# Patient Record
Sex: FEMALE | Race: WHITE | NOT HISPANIC OR LATINO | Employment: OTHER | RURAL
[De-identification: names, ages, dates, MRNs, and addresses within clinical notes are randomized per-mention and may not be internally consistent; named-entity substitution may affect disease eponyms.]

---

## 2021-08-08 ENCOUNTER — HOSPITAL ENCOUNTER (EMERGENCY)
Facility: HOSPITAL | Age: 56
Discharge: HOME | End: 2021-08-08
Payer: COMMERCIAL

## 2021-08-08 VITALS
SYSTOLIC BLOOD PRESSURE: 127 MMHG | DIASTOLIC BLOOD PRESSURE: 77 MMHG | OXYGEN SATURATION: 98 % | RESPIRATION RATE: 16 BRPM | TEMPERATURE: 98 F | BODY MASS INDEX: 31.84 KG/M2 | HEART RATE: 77 BPM | WEIGHT: 215 LBS | HEIGHT: 69 IN

## 2021-08-08 DIAGNOSIS — M17.11 PRIMARY OSTEOARTHRITIS OF RIGHT KNEE: Primary | ICD-10-CM

## 2021-08-08 DIAGNOSIS — R52 PAIN: ICD-10-CM

## 2021-08-08 PROCEDURE — 99283 EMERGENCY DEPT VISIT LOW MDM: CPT | Mod: ,,, | Performed by: NURSE PRACTITIONER

## 2021-08-08 PROCEDURE — 99283 PR EMERGENCY DEPT VISIT,LEVEL III: ICD-10-PCS | Mod: ,,, | Performed by: NURSE PRACTITIONER

## 2021-08-08 PROCEDURE — 99283 EMERGENCY DEPT VISIT LOW MDM: CPT

## 2021-08-08 RX ORDER — MELOXICAM 15 MG/1
15 TABLET ORAL DAILY PRN
Qty: 30 TABLET | Refills: 2 | Status: SHIPPED | OUTPATIENT
Start: 2021-08-08 | End: 2021-09-07

## 2021-08-10 PROBLEM — M17.11 PRIMARY OSTEOARTHRITIS OF RIGHT KNEE: Status: ACTIVE | Noted: 2021-08-10

## 2021-08-24 ENCOUNTER — HOSPITAL ENCOUNTER (OUTPATIENT)
Dept: RADIOLOGY | Facility: HOSPITAL | Age: 56
Discharge: HOME | End: 2021-08-24
Attending: ORTHOPAEDIC SURGERY
Payer: COMMERCIAL

## 2021-08-24 DIAGNOSIS — M25.561 ACUTE PAIN OF BOTH KNEES: ICD-10-CM

## 2021-08-24 DIAGNOSIS — M25.562 ACUTE PAIN OF BOTH KNEES: ICD-10-CM

## 2021-08-24 PROBLEM — M17.0 BILATERAL PRIMARY OSTEOARTHRITIS OF KNEE: Status: ACTIVE | Noted: 2021-08-24

## 2021-08-24 PROCEDURE — 73564 X-RAY EXAM KNEE 4 OR MORE: CPT | Mod: TC,50

## 2022-01-03 ENCOUNTER — OFFICE VISIT (OUTPATIENT)
Dept: FAMILY MEDICINE | Facility: CLINIC | Age: 57
End: 2022-01-03
Payer: COMMERCIAL

## 2022-01-03 VITALS
DIASTOLIC BLOOD PRESSURE: 84 MMHG | TEMPERATURE: 97 F | WEIGHT: 210 LBS | BODY MASS INDEX: 31.1 KG/M2 | HEART RATE: 105 BPM | HEIGHT: 69 IN | RESPIRATION RATE: 20 BRPM | SYSTOLIC BLOOD PRESSURE: 124 MMHG | OXYGEN SATURATION: 98 %

## 2022-01-03 DIAGNOSIS — J40 BRONCHITIS: Primary | ICD-10-CM

## 2022-01-03 PROCEDURE — 3079F PR MOST RECENT DIASTOLIC BLOOD PRESSURE 80-89 MM HG: ICD-10-PCS | Mod: ,,, | Performed by: FAMILY MEDICINE

## 2022-01-03 PROCEDURE — 3008F BODY MASS INDEX DOCD: CPT | Mod: ,,, | Performed by: FAMILY MEDICINE

## 2022-01-03 PROCEDURE — 1159F PR MEDICATION LIST DOCUMENTED IN MEDICAL RECORD: ICD-10-PCS | Mod: ,,, | Performed by: FAMILY MEDICINE

## 2022-01-03 PROCEDURE — 3074F PR MOST RECENT SYSTOLIC BLOOD PRESSURE < 130 MM HG: ICD-10-PCS | Mod: ,,, | Performed by: FAMILY MEDICINE

## 2022-01-03 PROCEDURE — 3079F DIAST BP 80-89 MM HG: CPT | Mod: ,,, | Performed by: FAMILY MEDICINE

## 2022-01-03 PROCEDURE — 99213 PR OFFICE/OUTPT VISIT, EST, LEVL III, 20-29 MIN: ICD-10-PCS | Mod: 25,,, | Performed by: FAMILY MEDICINE

## 2022-01-03 PROCEDURE — 3008F PR BODY MASS INDEX (BMI) DOCUMENTED: ICD-10-PCS | Mod: ,,, | Performed by: FAMILY MEDICINE

## 2022-01-03 PROCEDURE — 1159F MED LIST DOCD IN RCRD: CPT | Mod: ,,, | Performed by: FAMILY MEDICINE

## 2022-01-03 PROCEDURE — 96372 THER/PROPH/DIAG INJ SC/IM: CPT | Mod: ,,, | Performed by: FAMILY MEDICINE

## 2022-01-03 PROCEDURE — 96372 PR INJECTION,THERAP/PROPH/DIAG2ST, IM OR SUBCUT: ICD-10-PCS | Mod: ,,, | Performed by: FAMILY MEDICINE

## 2022-01-03 PROCEDURE — 99213 OFFICE O/P EST LOW 20 MIN: CPT | Mod: 25,,, | Performed by: FAMILY MEDICINE

## 2022-01-03 PROCEDURE — 1160F RVW MEDS BY RX/DR IN RCRD: CPT | Mod: ,,, | Performed by: FAMILY MEDICINE

## 2022-01-03 PROCEDURE — 1160F PR REVIEW ALL MEDS BY PRESCRIBER/CLIN PHARMACIST DOCUMENTED: ICD-10-PCS | Mod: ,,, | Performed by: FAMILY MEDICINE

## 2022-01-03 PROCEDURE — 3074F SYST BP LT 130 MM HG: CPT | Mod: ,,, | Performed by: FAMILY MEDICINE

## 2022-01-03 RX ORDER — ALBUTEROL SULFATE 90 UG/1
2 AEROSOL, METERED RESPIRATORY (INHALATION) EVERY 6 HOURS PRN
Qty: 18 G | Refills: 2 | Status: SHIPPED | OUTPATIENT
Start: 2022-01-03 | End: 2023-01-03

## 2022-01-03 RX ORDER — METHYLPREDNISOLONE ACETATE 40 MG/ML
40 INJECTION, SUSPENSION INTRA-ARTICULAR; INTRALESIONAL; INTRAMUSCULAR; SOFT TISSUE
Status: COMPLETED | OUTPATIENT
Start: 2022-01-03 | End: 2022-01-03

## 2022-01-03 RX ORDER — AZITHROMYCIN 250 MG/1
TABLET, FILM COATED ORAL
Qty: 6 TABLET | Refills: 0 | Status: SHIPPED | OUTPATIENT
Start: 2022-01-03 | End: 2022-01-08

## 2022-01-03 RX ORDER — BUDESONIDE AND FORMOTEROL FUMARATE DIHYDRATE 160; 4.5 UG/1; UG/1
2 AEROSOL RESPIRATORY (INHALATION) EVERY 12 HOURS
Qty: 18 G | Refills: 1 | Status: SHIPPED | OUTPATIENT
Start: 2022-01-03 | End: 2023-01-03

## 2022-01-03 RX ADMIN — METHYLPREDNISOLONE ACETATE 40 MG: 40 INJECTION, SUSPENSION INTRA-ARTICULAR; INTRALESIONAL; INTRAMUSCULAR; SOFT TISSUE at 04:01

## 2022-01-17 PROBLEM — J40 BRONCHITIS: Status: ACTIVE | Noted: 2022-01-17

## 2022-01-17 NOTE — ASSESSMENT & PLAN NOTE
Patient has an acute bronchitis with exacerbation of his asthma so will treat him with Depo-Medrol IM as well as start him on a Zithromax Dosepak.  Additionally will start Symbicort twice daily as well as Ventolin inhaler p.r.n..  Follow-up should be on an as-needed basis.  OTC cough medicines i.e. Mucinex DM.

## 2022-01-17 NOTE — PROGRESS NOTES
Pablo Cancino DO   [facility]  [address] [address] [place]  [address]      PATIENT NAME: Beba Gutierrez  : 1965  DATE: 1/3/22  MRN: [id number]      Billing Provider: Pablo Cancino DO  Level of Service:   Patient PCP Information     Provider PCP Type    Primary Doctor No General          Reason for Visit / Chief Complaint: Cough (Dry cough. Was exposed to smoke in her home this weekend.), Wheezing (/), and Shortness of Breath (History of childhood asthma)       Update PCP  Update Chief Complaint         History of Present Illness / Problem Focused Workflow     Beba Gutierrez presents to the clinic with Cough (Dry cough. Was exposed to smoke in her home this weekend.), Wheezing (/), and Shortness of Breath (History of childhood asthma)     Patient has increasing cough with some shortness of breath and the cough is dry without any sputum production.  He denies any fever chills or sweats.  There is no changes in taste or smell.  He denies any nausea vomiting diarrhea or skin rashes or lesions.  Patient does have a history of asthma.      Review of Systems     Review of Systems   Constitutional: Negative for activity change, appetite change, chills, fatigue and fever.   HENT: Negative for nasal congestion, ear discharge, ear pain, mouth dryness, mouth sores, postnasal drip, sinus pressure/congestion, sore throat and voice change.    Eyes: Negative for pain, discharge, redness, itching and visual disturbance.   Respiratory: Positive for cough and shortness of breath. Negative for apnea, chest tightness and wheezing.    Cardiovascular: Negative for chest pain, palpitations and leg swelling.   Gastrointestinal: Negative for abdominal distention, abdominal pain, anal bleeding, blood in stool, change in bowel habit, constipation, diarrhea, nausea, vomiting, reflux and change in bowel habit.   Endocrine: Negative for cold intolerance, heat intolerance, polydipsia, polyphagia and  polyuria.   Genitourinary: Negative for difficulty urinating, enuresis, erectile dysfunction, frequency, genital sores, hematuria, hot flashes, menstrual irregularity, urgency and vaginal dryness.   Musculoskeletal: Negative for arthralgias, back pain, gait problem, leg pain, myalgias and neck pain.   Integumentary:  Negative for rash, mole/lesion, breast mass, breast discharge and breast tenderness.   Allergic/Immunologic: Negative for environmental allergies and food allergies.   Neurological: Negative for dizziness, vertigo, tremors, seizures, syncope, facial asymmetry, speech difficulty, weakness, light-headedness, numbness, headaches, disturbances in coordination, memory loss and coordination difficulties.   Hematological: Negative for adenopathy. Does not bruise/bleed easily.   Psychiatric/Behavioral: Negative for agitation, behavioral problems, confusion, decreased concentration, dysphoric mood, hallucinations, self-injury, sleep disturbance and suicidal ideas. The patient is not nervous/anxious and is not hyperactive.    Breast: Negative for mass and tenderness      Medical / Social / Family History     Past Medical History:   Diagnosis Date    Asthma     childhood, resolved with age.    Knee pain, bilateral        Past Surgical History:   Procedure Laterality Date    TUBAL LIGATION         Social History  Ms.  reports that she has quit smoking. She has never used smokeless tobacco. She reports previous alcohol use. She reports that she does not use drugs.    Family History  Ms.'s family history includes Cancer in her father.    Medications and Allergies     Medications  No outpatient medications have been marked as taking for the 1/3/22 encounter (Office Visit) with Pablo Cancino DO.       Allergies  Review of patient's allergies indicates:  No Known Allergies    Physical Examination     Vitals:    01/03/22 1534   BP: 124/84   Pulse: 105   Resp: 20   Temp: 96.7 °F (35.9 °C)     Physical Exam  Vitals  reviewed.   Constitutional:       Appearance: Normal appearance. She is normal weight.   HENT:      Head: Normocephalic and atraumatic.      Nose: Congestion present. No rhinorrhea.      Mouth/Throat:      Mouth: Mucous membranes are moist.      Pharynx: Oropharynx is clear. No oropharyngeal exudate or posterior oropharyngeal erythema.   Eyes:      General: No scleral icterus.     Extraocular Movements: Extraocular movements intact.      Conjunctiva/sclera: Conjunctivae normal.      Pupils: Pupils are equal, round, and reactive to light.   Neck:      Vascular: No carotid bruit.   Cardiovascular:      Rate and Rhythm: Normal rate and regular rhythm.      Pulses: Normal pulses.      Heart sounds: Normal heart sounds. No gallop.    Pulmonary:      Effort: Pulmonary effort is normal. No respiratory distress.      Breath sounds: Wheezing present.   Abdominal:      General: Abdomen is flat. Bowel sounds are normal.      Palpations: Abdomen is soft.   Musculoskeletal:         General: Normal range of motion.      Cervical back: Normal range of motion and neck supple.      Right lower leg: No edema.      Left lower leg: No edema.   Lymphadenopathy:      Cervical: No cervical adenopathy.   Skin:     General: Skin is warm and dry.      Capillary Refill: Capillary refill takes less than 2 seconds.      Coloration: Skin is not jaundiced.      Findings: No lesion.   Neurological:      General: No focal deficit present.      Mental Status: She is alert and oriented to person, place, and time. Mental status is at baseline.      Cranial Nerves: No cranial nerve deficit.      Sensory: No sensory deficit.      Motor: No weakness.      Coordination: Coordination normal.      Gait: Gait normal.      Deep Tendon Reflexes: Reflexes normal.   Psychiatric:         Mood and Affect: Mood normal.         Behavior: Behavior normal.         Thought Content: Thought content normal.         Judgment: Judgment normal.               No results  found for: WBC, HGB, HCT, MCV, PLT       No results found for: NA, K, CL, CO2, GLU, BUN, CREATININE, CALCIUM, PROT, ALBUMIN, BILITOT, ALKPHOS, AST, ALT, ANIONGAP, ESTGFRAFRICA, EGFRNONAA   X-Ray Knee Complete 4 Or More Views Bilat  See Procedure Notes for results.     IMPRESSION: Please see Ortho procedure notes for report.      This procedure was auto-finalized by: Virtual Radiologist     Procedures   Assessment and Plan (including Health Maintenance)      Problem List  Smart Sets  Document Outside HM   :    Plan:         Health Maintenance Due   Topic Date Due    Hepatitis C Screening  Never done    Lipid Panel  Never done    COVID-19 Vaccine (1) Never done    HIV Screening  Never done    TETANUS VACCINE  Never done    Mammogram  Never done    Cervical Cancer Screening  Never done    Colorectal Cancer Screening  Never done    Shingles Vaccine (1 of 2) Never done    Influenza Vaccine (1) Never done       Problem List Items Addressed This Visit        Pulmonary    Bronchitis - Primary    Current Assessment & Plan     Patient has an acute bronchitis with exacerbation of his asthma so will treat him with Depo-Medrol IM as well as start him on a Zithromax Dosepak.  Additionally will start Symbicort twice daily as well as Ventolin inhaler p.r.n..  Follow-up should be on an as-needed basis.  OTC cough medicines i.e. Mucinex DM.         Relevant Medications    albuterol (VENTOLIN HFA) 90 mcg/actuation inhaler    budesonide-formoterol 160-4.5 mcg (SYMBICORT) 160-4.5 mcg/actuation HFAA          The patient has no Health Maintenance topics of status Not Due    No future appointments.     Follow up in about 4 weeks (around 1/31/2022), or if symptoms worsen or fail to improve.     Signature:  Pablo Cancino, [address] [place]  [address]    Date of encounter: 1/3/22

## 2023-05-05 ENCOUNTER — PATIENT OUTREACH (OUTPATIENT)
Dept: ADMINISTRATIVE | Facility: HOSPITAL | Age: 58
End: 2023-05-05

## 2023-05-05 NOTE — PROGRESS NOTES
Per [employer] website, insurance is active and pt is listed on the attributed list needing a healthy you performed in 2023  Pt needs appt for hy, sent to [facility] to schedule via one note        .  Health Maintenance Due   Topic Date Due    Hepatitis C Screening  Never done    Cervical Cancer Screening  Never done    Lipid Panel  Never done    COVID-19 Vaccine (1) Never done    HIV Screening  Never done    TETANUS VACCINE  Never done    Mammogram  Never done    Hemoglobin A1c (Diabetic Prevention Screening)  Never done    Colorectal Cancer Screening  Never done    Shingles Vaccine (1 of 2) Never done